# Patient Record
Sex: MALE | Race: WHITE | Employment: FULL TIME | URBAN - METROPOLITAN AREA
[De-identification: names, ages, dates, MRNs, and addresses within clinical notes are randomized per-mention and may not be internally consistent; named-entity substitution may affect disease eponyms.]

---

## 2018-09-10 ENCOUNTER — HOSPITAL ENCOUNTER (EMERGENCY)
Age: 53
Discharge: HOME | End: 2018-09-10
Attending: EMERGENCY MEDICINE
Payer: COMMERCIAL

## 2018-09-10 ENCOUNTER — APPOINTMENT (OUTPATIENT)
Dept: CT IMAGING | Age: 53
End: 2018-09-10
Payer: COMMERCIAL

## 2018-09-10 ENCOUNTER — APPOINTMENT (OUTPATIENT)
Dept: NUCLEAR MEDICINE | Age: 53
End: 2018-09-10
Payer: COMMERCIAL

## 2018-09-10 ENCOUNTER — APPOINTMENT (OUTPATIENT)
Dept: GENERAL RADIOLOGY | Age: 53
End: 2018-09-10
Payer: COMMERCIAL

## 2018-09-10 VITALS
HEIGHT: 68 IN | HEART RATE: 82 BPM | WEIGHT: 200 LBS | BODY MASS INDEX: 30.31 KG/M2 | TEMPERATURE: 97.4 F | OXYGEN SATURATION: 97 % | DIASTOLIC BLOOD PRESSURE: 89 MMHG | RESPIRATION RATE: 16 BRPM | SYSTOLIC BLOOD PRESSURE: 132 MMHG

## 2018-09-10 DIAGNOSIS — R07.81 PLEURITIC CHEST PAIN: ICD-10-CM

## 2018-09-10 DIAGNOSIS — R59.0 MEDIASTINAL LYMPHADENOPATHY: Primary | ICD-10-CM

## 2018-09-10 DIAGNOSIS — R91.1 PULMONARY NODULE: ICD-10-CM

## 2018-09-10 LAB
A/G RATIO: 1.2 (ref 1.1–2.2)
ALBUMIN SERPL-MCNC: 4 G/DL (ref 3.4–5)
ALP BLD-CCNC: 80 U/L (ref 40–129)
ALT SERPL-CCNC: 14 U/L (ref 10–40)
ANION GAP SERPL CALCULATED.3IONS-SCNC: 12 MMOL/L (ref 3–16)
AST SERPL-CCNC: 15 U/L (ref 15–37)
BASOPHILS ABSOLUTE: 0 K/UL (ref 0–0.2)
BASOPHILS RELATIVE PERCENT: 0.5 %
BILIRUB SERPL-MCNC: 0.5 MG/DL (ref 0–1)
BUN BLDV-MCNC: 13 MG/DL (ref 7–20)
CALCIUM SERPL-MCNC: 9.1 MG/DL (ref 8.3–10.6)
CHLORIDE BLD-SCNC: 96 MMOL/L (ref 99–110)
CO2: 29 MMOL/L (ref 21–32)
CREAT SERPL-MCNC: 0.8 MG/DL (ref 0.9–1.3)
EKG ATRIAL RATE: 66 BPM
EKG ATRIAL RATE: 78 BPM
EKG DIAGNOSIS: NORMAL
EKG DIAGNOSIS: NORMAL
EKG P AXIS: 14 DEGREES
EKG P AXIS: 18 DEGREES
EKG P-R INTERVAL: 152 MS
EKG P-R INTERVAL: 158 MS
EKG Q-T INTERVAL: 350 MS
EKG Q-T INTERVAL: 374 MS
EKG QRS DURATION: 74 MS
EKG QRS DURATION: 84 MS
EKG QTC CALCULATION (BAZETT): 392 MS
EKG QTC CALCULATION (BAZETT): 399 MS
EKG R AXIS: 10 DEGREES
EKG R AXIS: 9 DEGREES
EKG T AXIS: 3 DEGREES
EKG T AXIS: 4 DEGREES
EKG VENTRICULAR RATE: 66 BPM
EKG VENTRICULAR RATE: 78 BPM
EOSINOPHILS ABSOLUTE: 0 K/UL (ref 0–0.6)
EOSINOPHILS RELATIVE PERCENT: 0.4 %
GFR AFRICAN AMERICAN: >60
GFR NON-AFRICAN AMERICAN: >60
GLOBULIN: 3.3 G/DL
GLUCOSE BLD-MCNC: 145 MG/DL (ref 70–99)
HCT VFR BLD CALC: 41.8 % (ref 40.5–52.5)
HEMOGLOBIN: 14.4 G/DL (ref 13.5–17.5)
LYMPHOCYTES ABSOLUTE: 1.1 K/UL (ref 1–5.1)
LYMPHOCYTES RELATIVE PERCENT: 12.8 %
MCH RBC QN AUTO: 29.3 PG (ref 26–34)
MCHC RBC AUTO-ENTMCNC: 34.4 G/DL (ref 31–36)
MCV RBC AUTO: 85 FL (ref 80–100)
MONOCYTES ABSOLUTE: 0.7 K/UL (ref 0–1.3)
MONOCYTES RELATIVE PERCENT: 7.8 %
NEUTROPHILS ABSOLUTE: 7 K/UL (ref 1.7–7.7)
NEUTROPHILS RELATIVE PERCENT: 78.5 %
PDW BLD-RTO: 13.2 % (ref 12.4–15.4)
PLATELET # BLD: 254 K/UL (ref 135–450)
PMV BLD AUTO: 7.1 FL (ref 5–10.5)
POTASSIUM SERPL-SCNC: 4.2 MMOL/L (ref 3.5–5.1)
RBC # BLD: 4.92 M/UL (ref 4.2–5.9)
SODIUM BLD-SCNC: 137 MMOL/L (ref 136–145)
TOTAL PROTEIN: 7.3 G/DL (ref 6.4–8.2)
TROPONIN: <0.01 NG/ML
TROPONIN: <0.01 NG/ML
WBC # BLD: 8.9 K/UL (ref 4–11)

## 2018-09-10 PROCEDURE — A9558 XE133 XENON 10MCI: HCPCS | Performed by: EMERGENCY MEDICINE

## 2018-09-10 PROCEDURE — 85025 COMPLETE CBC W/AUTO DIFF WBC: CPT

## 2018-09-10 PROCEDURE — 78582 LUNG VENTILAT&PERFUS IMAGING: CPT

## 2018-09-10 PROCEDURE — 71046 X-RAY EXAM CHEST 2 VIEWS: CPT

## 2018-09-10 PROCEDURE — A9540 TC99M MAA: HCPCS | Performed by: EMERGENCY MEDICINE

## 2018-09-10 PROCEDURE — 74176 CT ABD & PELVIS W/O CONTRAST: CPT

## 2018-09-10 PROCEDURE — 99285 EMERGENCY DEPT VISIT HI MDM: CPT

## 2018-09-10 PROCEDURE — 80053 COMPREHEN METABOLIC PANEL: CPT

## 2018-09-10 PROCEDURE — 93010 ELECTROCARDIOGRAM REPORT: CPT | Performed by: INTERNAL MEDICINE

## 2018-09-10 PROCEDURE — 93005 ELECTROCARDIOGRAM TRACING: CPT | Performed by: NURSE PRACTITIONER

## 2018-09-10 PROCEDURE — 71275 CT ANGIOGRAPHY CHEST: CPT

## 2018-09-10 PROCEDURE — 93005 ELECTROCARDIOGRAM TRACING: CPT | Performed by: EMERGENCY MEDICINE

## 2018-09-10 PROCEDURE — 84484 ASSAY OF TROPONIN QUANT: CPT

## 2018-09-10 PROCEDURE — 6360000004 HC RX CONTRAST MEDICATION: Performed by: NURSE PRACTITIONER

## 2018-09-10 PROCEDURE — 3430000000 HC RX DIAGNOSTIC RADIOPHARMACEUTICAL: Performed by: EMERGENCY MEDICINE

## 2018-09-10 RX ORDER — OXYCODONE HYDROCHLORIDE AND ACETAMINOPHEN 5; 325 MG/1; MG/1
1-2 TABLET ORAL
COMMUNITY

## 2018-09-10 RX ADMIN — Medication 10.6 MILLICURIE: at 13:41

## 2018-09-10 RX ADMIN — IOPAMIDOL 75 ML: 755 INJECTION, SOLUTION INTRAVENOUS at 10:38

## 2018-09-10 RX ADMIN — Medication 5.2 MILLICURIE: at 13:41

## 2018-09-10 ASSESSMENT — PAIN SCALES - GENERAL: PAINLEVEL_OUTOF10: 6

## 2018-09-10 ASSESSMENT — PAIN DESCRIPTION - PAIN TYPE: TYPE: ACUTE PAIN

## 2018-09-10 NOTE — PROGRESS NOTES
General Surgery consult placed @ [phone]  RE: dr. Rosario Sloan per Sabrina Lagunas NP. Mignon Gomez, NE[facility] with Dr. Alia Dutton returned call @ [phone], spoke to Dr. Elle Michel.

## 2018-09-10 NOTE — ED PROVIDER NOTES
contrast administration from an earlier study. This limits detection of calculi. No evidence of hydronephrosis. Noncontrast views of the liver, spleen, gallbladder are grossly unremarkable. Stomach is incompletely distended. Mild injection of fat is seen adjacent to the descending portion of duodenum and there is mild induration of fat extending inferior to the right hepatic lobe. No pancreatic stranding. Adrenal glands are within normal limits. Calcification of the abdominal aorta and iliac arteries. Subcentimeter short axis periaortic lymph nodes. GI/Bowel: Fecal loading of the colon. The appendix is within normal limits in caliber. Loops of small and large bowel are nondilated. Pelvis: Excreted contrast is seen within the bladder. Calcification of prostate. Inguinal lymph nodes are upper limits of normal in caliber, with fatty fannie. Peritoneum/Retroperitoneum: A small amount of pneumoperitoneum is seen within the midline upper pelvis as seen on series 2, image 103 with adjacent induration of fat. Immediately anterior, there is also induration and subcutaneous emphysema of subcutaneous fat about the umbilicus. Bones/Soft Tissues: Degenerative change of the spine. Small amount of pneumoperitoneum is seen deep to the umbilicus with induration of adjacent omental fat. Similar fatty induration and emphysema is seen within subcutaneous tissues immediately anterior, about the umbilicus. Correlate for any recent intervention or trauma at this site. Soft tissue infection is also a consideration. Given pneumoperitoneum, viscus perforation cannot be excluded in the appropriate clinical setting. Minimal induration is seen of fat about the margin of the duodenum. Correlate for signs or symptoms of duodenitis. No evidence of obstructive uropathy. Fecal loading of the colon. Xr Chest Standard (2 Vw)    Result Date: 9/10/2018  EXAMINATION: TWO VIEWS OF THE CHEST 9/10/2018 11:53 am COMPARISON: None.  HISTORY: ORDERING SYSTEM PROVIDED HISTORY: pain TECHNOLOGIST PROVIDED HISTORY: Reason for exam:->pain Ordering Physician Provided Reason for Exam: CP Acuity: Acute Type of Exam: Initial Relevant Medical/Surgical History: hital hernia surgery last week FINDINGS: The heart and pulmonary vascularity are within normal limits. There are no focal areas of consolidation or pleural effusion. The osseous structures are intact. A linear density in the lingula is consistent with a disc of atelectasis or scar. Lingular disc of atelectasis or scar     Nm Lung Vent/perfusion (vq)    Result Date: 9/10/2018  EXAMINATION: NUCLEAR MEDICINE VENTILATION PERFUSION SCAN. 9/10/2018 TECHNIQUE: 21.0 millicuries xenon 370 was administered via mask prior to planar imaging of the lungs in multiple projections. Then, 5.2 millicuries of Tc 57K MAA was administered intravenously prior to planar imaging of the lungs in similar projections. COMPARISON: Chest radiograph same day as well as CTPA. HISTORY: ORDERING SYSTEM PROVIDED HISTORY: SHORTNESS OF BREATH PRODUCED BY EXERTION OR STRESS FINDINGS: PERFUSION: Distribution of radiotracer is homogeneous. No segmental defects identified. VENTILATION: Ventilation images are unremarkable. CHEST RADIOGRAPH: No focal areas of consolidation or significant effusions on recent chest radiograph. Low probability for pulmonary embolus. Cta Pulmonary W Contrast    Result Date: 9/10/2018  EXAMINATION: CTA OF THE CHEST 9/10/2018 10:42 am TECHNIQUE: CTA of the chest was performed after the administration of intravenous contrast.  Multiplanar reformatted images are provided for review. MIP images are provided for review. Dose modulation, iterative reconstruction, and/or weight based adjustment of the mA/kV was utilized to reduce the radiation dose to as low as reasonably achievable.  COMPARISON: None HISTORY: ORDERING SYSTEM PROVIDED HISTORY: chest pain, sob post op TECHNOLOGIST PROVIDED HISTORY: Ordering Physician Provided Reason for Exam: sob since 430 am today-s/p umbilcial hernia repair last wk Acuity: Acute Type of Exam: Initial Additional signs and symptoms: former smoker Patient is a 81-year-old male with chest pain, shortness of breath who is postop status post umbilical hernia repair last week; shortness of breath since 04:30 this morning. FINDINGS: Pulmonary Arteries: Evaluation of the pulmonary arterial vasculature is severely limited to nondiagnostic due to suboptimal bolus timing. No obvious filling defect in the proximal right main, or left main pulmonary arteries. Evaluation of the distal main pulmonary arterial vasculature and beyond is severely limited to nondiagnostic. Mediastinum: Right hilar and mediastinal lymphadenopathy is present with a right hilar lymph node complex measuring 2.8 x 1.8 cm on image 145, series 2. Enlarged subcarinal lymph node measures 2.2 x 1.1 cm on image 131, series 2. Borderline enlarged AP window lymph nodes. Prominent lymph nodes in the left hilum. No axillary lymphadenopathy. No pericardial or pleural effusions. No dissection flap within the visualized portions of the aorta. No periaortic or mediastinal hemorrhage. Visualized thyroid gland grossly unremarkable in appearance. Coronary artery disease. Lungs/pleura: Trachea and proximal central airways appear patent. Mild emphysema. Bilateral lower lobe scarring and atelectasis. There is a 5 mm pulmonary nodule in the right minor fissure on image 68, series 3. Upper Abdomen: Moderate stool burden. Indeterminate metallic radiodensity along the anterior aspect of the gastric antrum. Partial visualization of the upper abdomen is grossly unremarkable. Soft Tissues/Bones: Mild diffuse degenerative changes throughout the spine. Multilevel Schmorl's node deformities in the thoracolumbar junction. 1. Evaluation of the pulmonary arterial vasculature is severely limited due to suboptimal bolus timing.   No obvious filling defect to the level of the proximal right and left main pulmonary arteries. Remainder of the pulmonary arterial vasculature is severely limited to nondiagnostic. Consider further evaluation with a V/Q scan. 2. Right hilar and mediastinal lymphadenopathy as detailed above. Prominent lymph nodes in the AP window and left hilum. The cause of this is unclear. Differential considerations include reactive lymphadenopathy, lymphoma, or metastatic disease. 3. Mild emphysema. Bilateral lower lobe scarring and atelectasis. 4. There is a 5 mm right minor fissure pulmonary nodule. Follow-up is recommend according to Fleischner Society guidelines provided below. RECOMMENDATIONS: Fleischner Society guidelines for follow-up and management of incidentally detected pulmonary nodules: Single Solid Nodule: Nodule size less than 6 mm In a low-risk patient, no routine follow-up. In a high-risk patient, optional CT at 12 months. - Low risk patients include individuals with minimal or absent history of smoking and other known risk factors. - High risk patients include individuals with a history or smoking or known risk factors. Radiology 2017 http://pubs. rsna.org/doi/full/10.1148/radiol. 2558896903     This is a 43-year-old male presenting with pleuritic type chest pain. Initial differential diagnosis with patient's recent history surgery did include pulmonary embolism as well as postsurgical diaphragmatic irritation causing pleuritic pain. CT chest abdomen pelvis was performed. The CT of the abdomen pelvis results were discussed in detail with surgeon group. They do believe the CT findings are consistent with normal postoperative course. Unfortunately the contrast bolus for the CT of the chest was not timed well enough to definitively rule out pulmonary embolism. A VQ scan was performed which is low probability.   He'll discussion with the patient and wife at the bedside regarding curent workup and recommendation to admit for further cardiac evaluation as a potential cause for his pain. Patient prefers to have this further worked up as an outpatient with his primary care physician. He was referred back to his primary care as well for repeat CT scan of the chest in the next few weeks to reassess pulmonary nodule as well as the lymphadenopathy. I did discuss with him the broad differential diagnosis and importance of following up in regards to the nodule and lymph nodes. Patient says he will schedule outpatient cardiac stress test as well with his primary care physician. He will also call the surgeon and schedule follow-up appointment this week.        Stanislav Siddiqui, DO  09/10/18 1930

## 2018-09-10 NOTE — ED NOTES
Patient returned from Nuclear Medicine. Awaiting results of scan. All vital signs stable. Will continue to monitor.      Alexa Sheridan RN  09/10/18 [id number]

## 2018-09-12 NOTE — ED PROVIDER NOTES
abnormalityNo previous ECGs availableConfirmed by LIBAN GROVE MD ([id number]) on 9/10/2018 4:23:03 PM   EKG 12 Lead   Result Value Ref Range    Ventricular Rate 66 BPM    Atrial Rate 66 BPM    P-R Interval 152 ms    QRS Duration 74 ms    Q-T Interval 374 ms    QTc Calculation (Bazett) 392 ms    P Axis 18 degrees    R Axis 10 degrees    T Axis 4 degrees    Diagnosis       Normal sinus rhythmNonspecific ST & T wave changesWhen compared with ECG of 10-SEP-2018 09:23,No significant change was foundConfirmed by LIBAN Hicks MD ([id number]) on 9/10/2018 4:23:22 PM         RADIOLOGY:  All x-ray studies are viewed/reviewed by me. Formal interpretations per the radiologist are as follows:      CTA PULMONARY W CONTRAST   Final Result   1. Evaluation of the pulmonary arterial vasculature is severely limited due   to suboptimal bolus timing. No obvious filling defect to the level of the   proximal right and left main pulmonary arteries. Remainder of the pulmonary   arterial vasculature is severely limited to nondiagnostic. Consider further   evaluation with a V/Q scan. 2. Right hilar and mediastinal lymphadenopathy as detailed above. Prominent   lymph nodes in the AP window and left hilum. The cause of this is unclear. Differential considerations include reactive lymphadenopathy, lymphoma, or   metastatic disease. 3. Mild emphysema. Bilateral lower lobe scarring and atelectasis. 4. There is a 5 mm right minor fissure pulmonary nodule. Follow-up is   recommend according to Fleischner Society guidelines provided below. RECOMMENDATIONS:   Fleischner Society guidelines for follow-up and management of incidentally   detected pulmonary nodules:      Single Solid Nodule:      Nodule size less than 6 mm   In a low-risk patient, no routine follow-up. In a high-risk patient, optional CT at 12 months. - Low risk patients include individuals with minimal or absent history of   smoking and other known risk factors.